# Patient Record
Sex: FEMALE | URBAN - METROPOLITAN AREA
[De-identification: names, ages, dates, MRNs, and addresses within clinical notes are randomized per-mention and may not be internally consistent; named-entity substitution may affect disease eponyms.]

---

## 2019-01-21 ENCOUNTER — HISTORICAL (OUTPATIENT)
Dept: ADMINISTRATIVE | Facility: HOSPITAL | Age: 34
End: 2019-01-21

## 2019-01-21 LAB
HAV IGM SERPL QL IA: NONREACTIVE
HBV CORE IGM SERPL QL IA: NONREACTIVE
HBV SURFACE AG SERPL QL IA: NEGATIVE
HCV AB SERPL QL IA: NONREACTIVE
HIV 1+2 AB+HIV1 P24 AG SERPL QL IA: NONREACTIVE
POC BETA-HCG (QUAL): NEGATIVE
T PALLIDUM AB SER QL: NONREACTIVE

## 2022-04-10 ENCOUNTER — HISTORICAL (OUTPATIENT)
Dept: ADMINISTRATIVE | Facility: HOSPITAL | Age: 37
End: 2022-04-10

## 2022-04-29 VITALS
HEIGHT: 64 IN | SYSTOLIC BLOOD PRESSURE: 111 MMHG | BODY MASS INDEX: 26.12 KG/M2 | DIASTOLIC BLOOD PRESSURE: 73 MMHG | WEIGHT: 153 LBS

## 2022-05-02 NOTE — HISTORICAL [facility] CERNER
This is a historical note converted from [employer]. Formatting and pictures may have been removed.  Please reference [employer] for original formatting and attached multimedia. Chief Complaint  Patient is requesting blood for a program she is in  History of Present Illness  Pt is a 34 yo female,?here today for sore throat times 1 week?and?for physical exam to enter adult and seen challenge [place]. ?Patient states sore throat is worse at night, requesting strep swab.? Denies nasal congestion, sinus headache, fever, chills, body aches, nausea, vomiting, diarrhea, cough.? Patient states that her ears do hurt off and on.? Patient denies any history of?hepatitis, TB,?other STDs.  Review of Systems  Constitutional: negative except as stated in HPI  Eye: negative except as stated in HPI  ENT: negative except as stated in HPI  Respiratory: negative except as stated in HPI  Cardiovascular: negative except as stated in HPI  Gastrointestinal: negative except as stated in HPI  Genitourinary: negative except as stated in HPI  Hema/Lymph: negative except as stated in HPI  Endocrine: negative except as stated in HPI  Immunologic: negative except as stated in HPI  Musculoskeletal: negative except as stated in HPI  Integumentary: negative except as stated in HPI  Neurologic: negative except as stated in HPI  All Other ROS_ negative except as stated in HPI  ?  ?  Physical Exam  Vitals & Measurements  T:?37.0? ?C (Oral)? HR:?81(Peripheral)? BP:?111/73?  HT:?162?cm? HT:?162?cm? WT:?69.39?kg? WT:?69.39?kg? BMI:?26.44?  General: Alert and oriented, No acute distress.  Eye: Pupils are equal, round and reactive to light, Extraocular movements are intact.  HENT: Normocephalic. TMs clear pearly, nares without discharge or congestion, oropharynx with slight erythema, no lesions or exudate.  Neck: Supple, Non-tender, No lymphadenopathy.  Respiratory: Lungs are clear to auscultation, Respirations are non-labored, Breath sounds are equal,  Symmetrical chest wall expansion.  Cardiovascular: Normal rate, Regular rhythm, No murmur.  Gastrointestinal: Soft, Non-tender, Non-distended, Normal bowel sounds.  Genitourinary: Voiding well. No flank pain.  Musculoskeletal: Normal range of motion.  Integumentary: Warm, Dry, Intact.  Neurologic: No focal deficits, Cranial Nerves II-XII are grossly intact.  ?  Assessment/Plan  1.?Strep throat?J02.0  ?Salt water gargle. Medication as ordered. Change toothbrush after 3 days of antibiotics. F/u with pcp. ER precautions.  Ordered:  amoxicillin, 875 mg = 1 tab(s), Oral, BID, X 10 day(s), # 20 tab(s), 0 Refill(s)  After Hrs Visit [id number] PC, Strep throat  Physical exam for [place]  Encounter for pregnancy test  Screening for tuberculosis  Screening for STD (sexually transmitted disease)  Need for hepatitis C screening test, 01/21/19 19:42:00 CST  Office/Outpatient Visit Level 4 [facility] [id number] PC, Strep throat  Physical exam for [place]  Encounter for pregnancy test  Screening for tuberculosis  Screening for STD (sexually transmitted disease)  Need for hepatitis C screening test, 01/21/19 19:42:00 CST  ?  2.?Physical exam for [place]?Z02.89  ?Normal physical exam, lab work pending. Notified pt when results would be available. F/u with pcp. ER precautions.  ?  Ordered:  After Hrs Visit [id number] PC, Strep throat  Physical exam for [place]  Encounter for pregnancy test  Screening for tuberculosis  Screening for STD (sexually transmitted disease)  Need for hepatitis C screening test, 01/21/19 19:42:00 CST  Hepatitis Panel [facility], Stat collect, 01/21/19 19:20:00 CST, Blood, Stop date 01/21/19 19:29:00 CST, Nurse collect, Physical exam for [place]  Need for hepatitis C screening test, 01/21/19 19:20:00 CST  HIV 1 and 2, Stat collect, 01/21/19 19:22:00 CST, Blood, Stop date 01/21/19 19:29:00 CST, Nurse collect, Physical exam for [place]  Screening for STD (sexually transmitted disease), 01/21/19 19:22:00 CST  Office/Outpatient Visit Level  4 [place] [id number] PC, Strep throat  Physical exam for [place]  Encounter for pregnancy test  Screening for tuberculosis  Screening for STD (sexually transmitted disease)  Need for hepatitis C screening test, 01/21/19 19:42:00 CST  Syphilis Antibody (with Reflex RPR), Stat collect, 01/21/19 19:22:00 CST, Blood, Stop date 01/21/19 19:29:00 CST, Nurse collect, Physical exam for [place]  Screening for STD (sexually transmitted disease), 01/21/19 19:22:00 CST  Urine Pregnancy POC, 01/21/19 19:19:00 CST  ?  3.?Encounter for pregnancy test?Z32.00  ?Pregnancy test neg. Practice safe sex. F/u with pcp.  Ordered:  After Hrs Visit [id number] PC, Strep throat  Physical exam for [place]  Encounter for pregnancy test  Screening for tuberculosis  Screening for STD (sexually transmitted disease)  Need for hepatitis C screening test, 01/21/19 19:42:00 CST  Office/Outpatient Visit Level 4 [place] [id number] PC, Strep throat  Physical exam for [place]  Encounter for pregnancy test  Screening for tuberculosis  Screening for STD (sexually transmitted disease)  Need for hepatitis C screening test, 01/21/19 19:42:00 CST  Urine Pregnancy POC, 01/21/19 19:19:00 CST  ?  4.?Screening for tuberculosis?Z11.1  ?ppd given in office, pt instructed when to return for read.??ER precautions.  Ordered:  After Hrs Visit [id number] PC, Strep throat  Physical exam for [place]  Encounter for pregnancy test  Screening for tuberculosis  Screening for STD (sexually transmitted disease)  Need for hepatitis C screening test, 01/21/19 19:42:00 CST  Office/Outpatient Visit Level 4 [place] [id number] PC, Strep throat  Physical exam for [place]  Encounter for pregnancy test  Screening for tuberculosis  Screening for STD (sexually transmitted disease)  Need for hepatitis C screening test, 01/21/19 19:42:00 CST  PPD Skin Test POC, 01/21/19 19:23:00 CST  ?  5.?Screening for STD (sexually transmitted disease)?Z11.3  ??STD?testing done and sent to lab. Will notify pt of abnormal results.  Practice safe sex, avoid activitis with potential for hep c exposure. F/u with pcp. ER precautions.  Ordered:  After Hrs Visit [id number] PC, Strep throat  Physical exam for [place]  Encounter for pregnancy test  Screening for tuberculosis  Screening for STD (sexually transmitted disease)  Need for hepatitis C screening test, 01/21/19 19:42:00 CST  HIV 1 and 2, Stat collect, 01/21/19 19:22:00 CST, Blood, Stop date 01/21/19 19:29:00 CST, Nurse collect, Physical exam for [place]  Screening for STD (sexually transmitted disease), 01/21/19 19:22:00 CST  Office/Outpatient Visit Level 4 [place] [id number] PC, Strep throat  Physical exam for [place]  Encounter for pregnancy test  Screening for tuberculosis  Screening for STD (sexually transmitted disease)  Need for hepatitis C screening test, 01/21/19 19:42:00 CST  Syphilis Antibody (with Reflex RPR), Stat collect, 01/21/19 19:22:00 CST, Blood, Stop date 01/21/19 19:29:00 CST, Nurse collect, Physical exam for [place]  Screening for STD (sexually transmitted disease), 01/21/19 19:22:00 CST  ?  6.?Need for hepatitis C screening test?Z11.59  Hepatitis panel sent to lab. Will notify pt of abnormal results. Practice safe sex. F/u with pcp. ER precautions.  Ordered:  After Hrs Visit [id number] PC, Strep throat  Physical exam for [place]  Encounter for pregnancy test  Screening for tuberculosis  Screening for STD (sexually transmitted disease)  Need for hepatitis C screening test, 01/21/19 19:42:00 CST  Hepatitis Panel [facility], Stat collect, 01/21/19 19:20:00 CST, Blood, Stop date 01/21/19 19:29:00 CST, Nurse collect, Physical exam for [place]  Need for hepatitis C screening test, 01/21/19 19:20:00 CST  Office/Outpatient Visit Level 4 [place] [id number] PC, Strep throat  Physical exam for [place]  Encounter for pregnancy test  Screening for tuberculosis  Screening for STD (sexually transmitted disease)  Need for hepatitis C screening test, 01/21/19 19:42:00 CST  ?  Sore throat?J02.9  ?   Problem List/Past  Medical History  Ongoing  No qualifying data  Historical  No qualifying data  Medications  Amoxil 875 mg oral tablet, 875 mg= 1 tab(s), Oral, BID  Allergies  No Known Allergies  Social History  Tobacco  Never (less than 100 in lifetime), N/A, 01/21/2019  Health Maintenance  Health Maintenance  ???Pending?(in the next year)  ??? ??Due?  ??? ? ? ?ADL Screening due??01/21/19??and every 1??year(s)  ??? ? ? ?Alcohol Misuse Screening due??01/21/19??and every 1??year(s)  ??? ? ? ?Tetanus Vaccine due??01/21/19??and every 10??year(s)  ???Satisfied?(in the past 1 year)  ??? ??Satisfied?  ??? ? ? ?Blood Pressure Screening on??01/21/19.??Satisfied by Kait Aguilera MA  ??? ? ? ?Body Mass Index Check on??01/21/19.??Satisfied by Kait Aguilera MA  ??? ? ? ?Depression Screening on??01/21/19.??Satisfied by Kait Aguilera MA  ??? ? ? ?Obesity Screening on??01/21/19.??Satisfied by Kait Aguilera MA  ?  ?      Rapid strep swab positive.